# Patient Record
Sex: FEMALE | Race: WHITE | NOT HISPANIC OR LATINO
[De-identification: names, ages, dates, MRNs, and addresses within clinical notes are randomized per-mention and may not be internally consistent; named-entity substitution may affect disease eponyms.]

---

## 2017-06-30 ENCOUNTER — NON-APPOINTMENT (OUTPATIENT)
Age: 22
End: 2017-06-30

## 2017-06-30 ENCOUNTER — APPOINTMENT (OUTPATIENT)
Dept: INTERNAL MEDICINE | Facility: CLINIC | Age: 22
End: 2017-06-30

## 2017-06-30 VITALS — RESPIRATION RATE: 14 BRPM | HEART RATE: 72 BPM | SYSTOLIC BLOOD PRESSURE: 110 MMHG | DIASTOLIC BLOOD PRESSURE: 70 MMHG

## 2017-06-30 VITALS — WEIGHT: 127 LBS | BODY MASS INDEX: 21.42 KG/M2 | HEIGHT: 64.5 IN

## 2017-06-30 DIAGNOSIS — Z83.79 FAMILY HISTORY OF OTHER DISEASES OF THE DIGESTIVE SYSTEM: ICD-10-CM

## 2017-06-30 DIAGNOSIS — Z87.09 PERSONAL HISTORY OF OTHER DISEASES OF THE RESPIRATORY SYSTEM: ICD-10-CM

## 2017-06-30 DIAGNOSIS — Z82.49 FAMILY HISTORY OF ISCHEMIC HEART DISEASE AND OTHER DISEASES OF THE CIRCULATORY SYSTEM: ICD-10-CM

## 2017-06-30 DIAGNOSIS — Z78.9 OTHER SPECIFIED HEALTH STATUS: ICD-10-CM

## 2017-06-30 DIAGNOSIS — Z82.0 FAMILY HISTORY OF EPILEPSY AND OTHER DISEASES OF THE NERVOUS SYSTEM: ICD-10-CM

## 2017-06-30 DIAGNOSIS — Z83.49 FAMILY HISTORY OF OTHER ENDOCRINE, NUTRITIONAL AND METABOLIC DISEASES: ICD-10-CM

## 2017-06-30 DIAGNOSIS — Z80.8 FAMILY HISTORY OF MALIGNANT NEOPLASM OF OTHER ORGANS OR SYSTEMS: ICD-10-CM

## 2017-06-30 DIAGNOSIS — Z87.39 PERSONAL HISTORY OF OTHER DISEASES OF THE MUSCULOSKELETAL SYSTEM AND CONNECTIVE TISSUE: ICD-10-CM

## 2017-06-30 RX ORDER — NORETHINDRONE ACETATE, ETHINYL ESTRADIOL AND FERROUS FUMARATE 1MG-20(24)
1-20 KIT ORAL
Qty: 84 | Refills: 0 | Status: ACTIVE | COMMUNITY
Start: 2017-05-15

## 2017-07-01 LAB
25(OH)D3 SERPL-MCNC: 46.2 NG/ML
ALBUMIN SERPL ELPH-MCNC: 4.7 G/DL
ALP BLD-CCNC: 42 U/L
ALT SERPL-CCNC: 6 U/L
ANION GAP SERPL CALC-SCNC: 17 MMOL/L
AST SERPL-CCNC: 19 U/L
BASOPHILS # BLD AUTO: 0.02 K/UL
BASOPHILS NFR BLD AUTO: 0.3 %
BILIRUB SERPL-MCNC: 0.8 MG/DL
BUN SERPL-MCNC: 12 MG/DL
CALCIUM SERPL-MCNC: 10 MG/DL
CHLORIDE SERPL-SCNC: 102 MMOL/L
CHOLEST SERPL-MCNC: 164 MG/DL
CHOLEST/HDLC SERPL: 2.4 RATIO
CO2 SERPL-SCNC: 21 MMOL/L
CREAT SERPL-MCNC: 1.02 MG/DL
EOSINOPHIL # BLD AUTO: 0.22 K/UL
EOSINOPHIL NFR BLD AUTO: 3.2 %
FOLATE SERPL-MCNC: 13.4 NG/ML
GLUCOSE SERPL-MCNC: 100 MG/DL
HBA1C MFR BLD HPLC: 5.1 %
HCT VFR BLD CALC: 43 %
HDLC SERPL-MCNC: 68 MG/DL
HGB BLD-MCNC: 13.9 G/DL
IMM GRANULOCYTES NFR BLD AUTO: 0.1 %
LDLC SERPL CALC-MCNC: 85 MG/DL
LYMPHOCYTES # BLD AUTO: 1.89 K/UL
LYMPHOCYTES NFR BLD AUTO: 27.2 %
MAGNESIUM SERPL-MCNC: 2.1 MG/DL
MAN DIFF?: NORMAL
MCHC RBC-ENTMCNC: 29.3 PG
MCHC RBC-ENTMCNC: 32.3 GM/DL
MCV RBC AUTO: 90.5 FL
MEV IGG FLD QL IA: >300 AU/ML
MEV IGG+IGM SER-IMP: POSITIVE
MONOCYTES # BLD AUTO: 0.53 K/UL
MONOCYTES NFR BLD AUTO: 7.6 %
MUV AB SER-ACNC: POSITIVE
MUV IGG SER QL IA: 80 AU/ML
NEUTROPHILS # BLD AUTO: 4.27 K/UL
NEUTROPHILS NFR BLD AUTO: 61.6 %
PLATELET # BLD AUTO: 163 K/UL
POTASSIUM SERPL-SCNC: 4.3 MMOL/L
PROT SERPL-MCNC: 7.7 G/DL
RBC # BLD: 4.75 M/UL
RBC # FLD: 13.4 %
RUBV IGG FLD-ACNC: 3.2 INDEX
RUBV IGG SER-IMP: POSITIVE
SODIUM SERPL-SCNC: 140 MMOL/L
T4 FREE SERPL-MCNC: 1.4 NG/DL
T4 SERPL-MCNC: 10.3 UG/DL
TRIGL SERPL-MCNC: 53 MG/DL
TSH SERPL-ACNC: 2.18 UIU/ML
VIT B12 SERPL-MCNC: 255 PG/ML
WBC # FLD AUTO: 6.94 K/UL

## 2018-06-13 ENCOUNTER — APPOINTMENT (OUTPATIENT)
Dept: INTERNAL MEDICINE | Facility: CLINIC | Age: 23
End: 2018-06-13
Payer: COMMERCIAL

## 2018-06-13 PROCEDURE — 86580 TB INTRADERMAL TEST: CPT

## 2018-07-03 ENCOUNTER — APPOINTMENT (OUTPATIENT)
Dept: INTERNAL MEDICINE | Facility: CLINIC | Age: 23
End: 2018-07-03
Payer: COMMERCIAL

## 2018-07-03 ENCOUNTER — MED ADMIN CHARGE (OUTPATIENT)
Age: 23
End: 2018-07-03

## 2018-07-03 PROCEDURE — 90715 TDAP VACCINE 7 YRS/> IM: CPT

## 2018-07-03 PROCEDURE — 90471 IMMUNIZATION ADMIN: CPT

## 2018-07-23 PROBLEM — Z80.8 FAMILY HISTORY OF BASAL CELL CARCINOMA: Status: ACTIVE | Noted: 2017-06-30

## 2018-08-27 ENCOUNTER — NON-APPOINTMENT (OUTPATIENT)
Age: 23
End: 2018-08-27

## 2018-08-27 ENCOUNTER — APPOINTMENT (OUTPATIENT)
Dept: INTERNAL MEDICINE | Facility: CLINIC | Age: 23
End: 2018-08-27
Payer: COMMERCIAL

## 2018-08-27 VITALS
HEIGHT: 64.5 IN | SYSTOLIC BLOOD PRESSURE: 120 MMHG | DIASTOLIC BLOOD PRESSURE: 80 MMHG | WEIGHT: 124 LBS | BODY MASS INDEX: 20.91 KG/M2

## 2018-08-27 DIAGNOSIS — Z23 ENCOUNTER FOR IMMUNIZATION: ICD-10-CM

## 2018-08-27 DIAGNOSIS — J30.2 OTHER SEASONAL ALLERGIC RHINITIS: ICD-10-CM

## 2018-08-27 PROCEDURE — 93000 ELECTROCARDIOGRAM COMPLETE: CPT

## 2018-08-27 PROCEDURE — 99395 PREV VISIT EST AGE 18-39: CPT | Mod: 25

## 2018-08-28 ENCOUNTER — RESULT REVIEW (OUTPATIENT)
Age: 23
End: 2018-08-28

## 2018-08-28 LAB
ALBUMIN SERPL ELPH-MCNC: 4.6 G/DL
ALP BLD-CCNC: 35 U/L
ALT SERPL-CCNC: 7 U/L
ANION GAP SERPL CALC-SCNC: 12 MMOL/L
AST SERPL-CCNC: 23 U/L
BASOPHILS # BLD AUTO: 0.01 K/UL
BASOPHILS NFR BLD AUTO: 0.2 %
BILIRUB SERPL-MCNC: 1 MG/DL
BUN SERPL-MCNC: 7 MG/DL
CALCIUM SERPL-MCNC: 9.5 MG/DL
CHLORIDE SERPL-SCNC: 103 MMOL/L
CHOLEST SERPL-MCNC: 150 MG/DL
CHOLEST/HDLC SERPL: 2.6 RATIO
CO2 SERPL-SCNC: 22 MMOL/L
CREAT SERPL-MCNC: 0.66 MG/DL
EOSINOPHIL # BLD AUTO: 0.13 K/UL
EOSINOPHIL NFR BLD AUTO: 2.2 %
GLUCOSE SERPL-MCNC: 85 MG/DL
HCT VFR BLD CALC: 44.2 %
HDLC SERPL-MCNC: 58 MG/DL
HGB BLD-MCNC: 13.7 G/DL
IMM GRANULOCYTES NFR BLD AUTO: 0.2 %
LDLC SERPL CALC-MCNC: 74 MG/DL
LYMPHOCYTES # BLD AUTO: 1.74 K/UL
LYMPHOCYTES NFR BLD AUTO: 29 %
MAN DIFF?: NORMAL
MCHC RBC-ENTMCNC: 28.8 PG
MCHC RBC-ENTMCNC: 31 GM/DL
MCV RBC AUTO: 93.1 FL
MONOCYTES # BLD AUTO: 0.34 K/UL
MONOCYTES NFR BLD AUTO: 5.7 %
NEUTROPHILS # BLD AUTO: 3.77 K/UL
NEUTROPHILS NFR BLD AUTO: 62.7 %
PLATELET # BLD AUTO: 163 K/UL
POTASSIUM SERPL-SCNC: 4.2 MMOL/L
PROT SERPL-MCNC: 7.1 G/DL
RBC # BLD: 4.75 M/UL
RBC # FLD: 13.6 %
SODIUM SERPL-SCNC: 137 MMOL/L
TRIGL SERPL-MCNC: 92 MG/DL
WBC # FLD AUTO: 6 K/UL

## 2018-08-29 ENCOUNTER — RESULT REVIEW (OUTPATIENT)
Age: 23
End: 2018-08-29

## 2018-08-29 LAB — TSH SERPL-ACNC: 1.65 UIU/ML

## 2018-09-10 LAB
25(OH)D3 SERPL-MCNC: 41.1 NG/ML
FOLATE SERPL-MCNC: 17 NG/ML
HOMOCYSTEINE LEVEL: 19.2 UMOL/L
METHYLMALONIC ACID LEVEL: 124 NMOL/L
VIT B12 SERPL-MCNC: 283 PG/ML

## 2019-04-22 ENCOUNTER — APPOINTMENT (OUTPATIENT)
Dept: INTERNAL MEDICINE | Facility: CLINIC | Age: 24
End: 2019-04-22
Payer: COMMERCIAL

## 2019-04-22 VITALS
WEIGHT: 129 LBS | SYSTOLIC BLOOD PRESSURE: 120 MMHG | HEIGHT: 64.5 IN | BODY MASS INDEX: 21.75 KG/M2 | DIASTOLIC BLOOD PRESSURE: 70 MMHG

## 2019-04-22 DIAGNOSIS — L70.0 ACNE VULGARIS: ICD-10-CM

## 2019-04-22 PROCEDURE — 99213 OFFICE O/P EST LOW 20 MIN: CPT | Mod: 25

## 2019-04-22 PROCEDURE — 36415 COLL VENOUS BLD VENIPUNCTURE: CPT

## 2019-04-29 ENCOUNTER — RESULT REVIEW (OUTPATIENT)
Age: 24
End: 2019-04-29

## 2019-04-29 LAB — VIT B12 SERPL-MCNC: 700 PG/ML

## 2019-05-01 LAB
BASOPHILS # BLD AUTO: 0.03 K/UL
BASOPHILS NFR BLD AUTO: 0.6 %
EOSINOPHIL # BLD AUTO: 0.23 K/UL
EOSINOPHIL NFR BLD AUTO: 4.6 %
HCT VFR BLD CALC: 43.1 %
HGB BLD-MCNC: 13.8 G/DL
IMM GRANULOCYTES NFR BLD AUTO: 0.4 %
LYMPHOCYTES # BLD AUTO: 1.82 K/UL
LYMPHOCYTES NFR BLD AUTO: 36.8 %
MAN DIFF?: NORMAL
MCHC RBC-ENTMCNC: 29.7 PG
MCHC RBC-ENTMCNC: 32 GM/DL
MCV RBC AUTO: 92.9 FL
MONOCYTES # BLD AUTO: 0.44 K/UL
MONOCYTES NFR BLD AUTO: 8.9 %
NEUTROPHILS # BLD AUTO: 2.41 K/UL
NEUTROPHILS NFR BLD AUTO: 48.7 %
PLATELET # BLD AUTO: 135 K/UL
RBC # BLD: 4.64 M/UL
RBC # FLD: 12.9 %
WBC # FLD AUTO: 4.95 K/UL

## 2019-09-18 ENCOUNTER — NON-APPOINTMENT (OUTPATIENT)
Age: 24
End: 2019-09-18

## 2019-09-18 ENCOUNTER — APPOINTMENT (OUTPATIENT)
Dept: INTERNAL MEDICINE | Facility: CLINIC | Age: 24
End: 2019-09-18
Payer: COMMERCIAL

## 2019-09-18 VITALS
BODY MASS INDEX: 23.1 KG/M2 | DIASTOLIC BLOOD PRESSURE: 80 MMHG | HEIGHT: 63.5 IN | SYSTOLIC BLOOD PRESSURE: 120 MMHG | WEIGHT: 132 LBS

## 2019-09-18 DIAGNOSIS — D24.2 BENIGN NEOPLASM OF LEFT BREAST: ICD-10-CM

## 2019-09-18 DIAGNOSIS — D24.1 BENIGN NEOPLASM OF RIGHT BREAST: ICD-10-CM

## 2019-09-18 PROCEDURE — 36415 COLL VENOUS BLD VENIPUNCTURE: CPT

## 2019-09-18 PROCEDURE — 93000 ELECTROCARDIOGRAM COMPLETE: CPT

## 2019-09-18 PROCEDURE — 99395 PREV VISIT EST AGE 18-39: CPT | Mod: 25

## 2019-09-23 LAB
M TB IFN-G BLD-IMP: NEGATIVE
QUANTIFERON TB PLUS MITOGEN MINUS NIL: >10 IU/ML
QUANTIFERON TB PLUS NIL: 0.02 IU/ML
QUANTIFERON TB PLUS TB1 MINUS NIL: 0 IU/ML
QUANTIFERON TB PLUS TB2 MINUS NIL: 0.01 IU/ML

## 2019-11-06 ENCOUNTER — APPOINTMENT (OUTPATIENT)
Dept: INTERNAL MEDICINE | Facility: CLINIC | Age: 24
End: 2019-11-06
Payer: COMMERCIAL

## 2019-11-06 VITALS — RESPIRATION RATE: 14 BRPM | DIASTOLIC BLOOD PRESSURE: 80 MMHG | SYSTOLIC BLOOD PRESSURE: 120 MMHG | HEART RATE: 72 BPM

## 2019-11-06 VITALS — BODY MASS INDEX: 22.53 KG/M2 | WEIGHT: 132 LBS | HEIGHT: 64 IN

## 2019-11-06 PROCEDURE — 99213 OFFICE O/P EST LOW 20 MIN: CPT

## 2019-11-06 NOTE — ASSESSMENT
[FreeTextEntry1] : URI - likely viral\par \par Flonase\par Mucinex D\par \par F/U if symptoms do not resolve, or if they should change/worsen.\par

## 2019-11-06 NOTE — REVIEW OF SYSTEMS
[Fever] : no fever [Chills] : no chills [Nasal Discharge] : nasal discharge [Sore Throat] : no sore throat [Postnasal Drip] : postnasal drip [Wheezing] : no wheezing [Cough] : cough [Negative] : Genitourinary

## 2019-11-06 NOTE — HISTORY OF PRESENT ILLNESS
[Congestion] : congestion [Cough] : cough [Cold Symptoms] : cold symptoms [Moderate] : moderate [___ Days ago] : [unfilled] days ago [Constant] : constant [OTC Remedies] : OTC remedies [At Night] : at night [Worsening] : worsening [FreeTextEntry8] : Pt presents with URI symptoms for about 5 days.

## 2020-09-21 DIAGNOSIS — J06.9 ACUTE UPPER RESPIRATORY INFECTION, UNSPECIFIED: ICD-10-CM

## 2020-09-21 LAB
25(OH)D3 SERPL-MCNC: 37 NG/ML
ALBUMIN SERPL ELPH-MCNC: 4.8 G/DL
ALP BLD-CCNC: 48 U/L
ALT SERPL-CCNC: 6 U/L
ANION GAP SERPL CALC-SCNC: 13 MMOL/L
APPEARANCE: CLEAR
AST SERPL-CCNC: 16 U/L
BASOPHILS # BLD AUTO: 0.03 K/UL
BASOPHILS NFR BLD AUTO: 0.5 %
BILIRUB SERPL-MCNC: 0.5 MG/DL
BILIRUBIN URINE: NEGATIVE
BLOOD URINE: NEGATIVE
BUN SERPL-MCNC: 15 MG/DL
CALCIUM SERPL-MCNC: 9.7 MG/DL
CHLORIDE SERPL-SCNC: 104 MMOL/L
CO2 SERPL-SCNC: 22 MMOL/L
COLOR: YELLOW
CREAT SERPL-MCNC: 0.8 MG/DL
EOSINOPHIL # BLD AUTO: 0.15 K/UL
EOSINOPHIL NFR BLD AUTO: 2.3 %
GLUCOSE QUALITATIVE U: NEGATIVE
GLUCOSE SERPL-MCNC: 102 MG/DL
HCT VFR BLD CALC: 45.9 %
HGB BLD-MCNC: 14.7 G/DL
IMM GRANULOCYTES NFR BLD AUTO: 0.3 %
KETONES URINE: NEGATIVE
LEUKOCYTE ESTERASE URINE: NEGATIVE
LYMPHOCYTES # BLD AUTO: 2.17 K/UL
LYMPHOCYTES NFR BLD AUTO: 33.5 %
M TB IFN-G BLD-IMP: NEGATIVE
MAN DIFF?: NORMAL
MCHC RBC-ENTMCNC: 29.5 PG
MCHC RBC-ENTMCNC: 32 GM/DL
MCV RBC AUTO: 92.2 FL
MONOCYTES # BLD AUTO: 0.44 K/UL
MONOCYTES NFR BLD AUTO: 6.8 %
NEUTROPHILS # BLD AUTO: 3.67 K/UL
NEUTROPHILS NFR BLD AUTO: 56.6 %
NITRITE URINE: NEGATIVE
PH URINE: 7.5
PLATELET # BLD AUTO: 200 K/UL
POTASSIUM SERPL-SCNC: 4.3 MMOL/L
PROT SERPL-MCNC: 7.3 G/DL
PROTEIN URINE: NORMAL
QUANTIFERON TB PLUS MITOGEN MINUS NIL: 5.97 IU/ML
QUANTIFERON TB PLUS NIL: 0.01 IU/ML
QUANTIFERON TB PLUS TB1 MINUS NIL: 0.01 IU/ML
QUANTIFERON TB PLUS TB2 MINUS NIL: 0.04 IU/ML
RBC # BLD: 4.98 M/UL
RBC # FLD: 12.4 %
SODIUM SERPL-SCNC: 139 MMOL/L
SPECIFIC GRAVITY URINE: 1.03
T4 SERPL-MCNC: 10.8 UG/DL
TSH SERPL-ACNC: 2.36 UIU/ML
UROBILINOGEN URINE: NORMAL
WBC # FLD AUTO: 6.48 K/UL

## 2020-10-02 ENCOUNTER — APPOINTMENT (OUTPATIENT)
Dept: INTERNAL MEDICINE | Facility: CLINIC | Age: 25
End: 2020-10-02
Payer: COMMERCIAL

## 2020-10-02 ENCOUNTER — NON-APPOINTMENT (OUTPATIENT)
Age: 25
End: 2020-10-02

## 2020-10-02 VITALS
TEMPERATURE: 98.6 F | HEIGHT: 64 IN | OXYGEN SATURATION: 98 % | DIASTOLIC BLOOD PRESSURE: 80 MMHG | WEIGHT: 132 LBS | SYSTOLIC BLOOD PRESSURE: 110 MMHG | BODY MASS INDEX: 22.53 KG/M2 | HEART RATE: 103 BPM

## 2020-10-02 DIAGNOSIS — Z87.09 PERSONAL HISTORY OF OTHER DISEASES OF THE RESPIRATORY SYSTEM: ICD-10-CM

## 2020-10-02 LAB
CHOLEST SERPL-MCNC: 176 MG/DL
CHOLEST/HDLC SERPL: 3.1 RATIO
ESTIMATED AVERAGE GLUCOSE: 100 MG/DL
HBA1C MFR BLD HPLC: 5.1 %
HDLC SERPL-MCNC: 57 MG/DL
LDLC SERPL CALC-MCNC: 106 MG/DL
TRIGL SERPL-MCNC: 66 MG/DL
VIT B12 SERPL-MCNC: 566 PG/ML

## 2020-10-02 PROCEDURE — 90686 IIV4 VACC NO PRSV 0.5 ML IM: CPT

## 2020-10-02 PROCEDURE — 99395 PREV VISIT EST AGE 18-39: CPT | Mod: 25

## 2020-10-02 PROCEDURE — G0008: CPT

## 2021-09-03 ENCOUNTER — APPOINTMENT (OUTPATIENT)
Dept: INTERNAL MEDICINE | Facility: CLINIC | Age: 26
End: 2021-09-03

## 2021-10-18 ENCOUNTER — NON-APPOINTMENT (OUTPATIENT)
Age: 26
End: 2021-10-18

## 2021-10-18 ENCOUNTER — APPOINTMENT (OUTPATIENT)
Dept: INTERNAL MEDICINE | Facility: CLINIC | Age: 26
End: 2021-10-18
Payer: COMMERCIAL

## 2021-10-18 VITALS
SYSTOLIC BLOOD PRESSURE: 100 MMHG | WEIGHT: 136 LBS | HEIGHT: 64 IN | DIASTOLIC BLOOD PRESSURE: 70 MMHG | BODY MASS INDEX: 23.22 KG/M2

## 2021-10-18 PROCEDURE — 90686 IIV4 VACC NO PRSV 0.5 ML IM: CPT

## 2021-10-18 PROCEDURE — 36415 COLL VENOUS BLD VENIPUNCTURE: CPT

## 2021-10-18 PROCEDURE — 99395 PREV VISIT EST AGE 18-39: CPT | Mod: 25

## 2021-10-18 PROCEDURE — 93000 ELECTROCARDIOGRAM COMPLETE: CPT

## 2021-10-18 PROCEDURE — G0008: CPT

## 2021-10-19 ENCOUNTER — NON-APPOINTMENT (OUTPATIENT)
Age: 26
End: 2021-10-19

## 2021-10-19 LAB
25(OH)D3 SERPL-MCNC: 37.2 NG/ML
ALBUMIN SERPL ELPH-MCNC: 4.9 G/DL
ALP BLD-CCNC: 44 U/L
ALT SERPL-CCNC: 9 U/L
ANION GAP SERPL CALC-SCNC: 16 MMOL/L
APPEARANCE: CLEAR
AST SERPL-CCNC: 19 U/L
BASOPHILS # BLD AUTO: 0.04 K/UL
BASOPHILS NFR BLD AUTO: 0.6 %
BILIRUB SERPL-MCNC: 0.7 MG/DL
BILIRUBIN URINE: NEGATIVE
BLOOD URINE: NEGATIVE
BUN SERPL-MCNC: 8 MG/DL
CALCIUM SERPL-MCNC: 9.8 MG/DL
CHLORIDE SERPL-SCNC: 101 MMOL/L
CHOLEST SERPL-MCNC: 186 MG/DL
CO2 SERPL-SCNC: 21 MMOL/L
COLOR: NORMAL
CREAT SERPL-MCNC: 0.78 MG/DL
EOSINOPHIL # BLD AUTO: 0.11 K/UL
EOSINOPHIL NFR BLD AUTO: 1.8 %
ESTIMATED AVERAGE GLUCOSE: 100 MG/DL
GLUCOSE QUALITATIVE U: NEGATIVE
GLUCOSE SERPL-MCNC: 85 MG/DL
HBA1C MFR BLD HPLC: 5.1 %
HCT VFR BLD CALC: 42.6 %
HDLC SERPL-MCNC: 67 MG/DL
HGB BLD-MCNC: 14 G/DL
IMM GRANULOCYTES NFR BLD AUTO: 0.2 %
KETONES URINE: NEGATIVE
LDLC SERPL CALC-MCNC: 105 MG/DL
LEUKOCYTE ESTERASE URINE: NEGATIVE
LYMPHOCYTES # BLD AUTO: 2.24 K/UL
LYMPHOCYTES NFR BLD AUTO: 36.3 %
MAN DIFF?: NORMAL
MCHC RBC-ENTMCNC: 29.8 PG
MCHC RBC-ENTMCNC: 32.9 GM/DL
MCV RBC AUTO: 90.6 FL
MONOCYTES # BLD AUTO: 0.35 K/UL
MONOCYTES NFR BLD AUTO: 5.7 %
NEUTROPHILS # BLD AUTO: 3.42 K/UL
NEUTROPHILS NFR BLD AUTO: 55.4 %
NITRITE URINE: NEGATIVE
NONHDLC SERPL-MCNC: 119 MG/DL
PH URINE: 6.5
PLATELET # BLD AUTO: 165 K/UL
POTASSIUM SERPL-SCNC: 4.1 MMOL/L
PROT SERPL-MCNC: 7.1 G/DL
PROTEIN URINE: NEGATIVE
RBC # BLD: 4.7 M/UL
RBC # FLD: 12.4 %
SODIUM SERPL-SCNC: 139 MMOL/L
SPECIFIC GRAVITY URINE: 1.01
TRIGL SERPL-MCNC: 68 MG/DL
TSH SERPL-ACNC: 1.97 UIU/ML
UROBILINOGEN URINE: NORMAL
VIT B12 SERPL-MCNC: 500 PG/ML
WBC # FLD AUTO: 6.17 K/UL

## 2021-10-22 ENCOUNTER — NON-APPOINTMENT (OUTPATIENT)
Age: 26
End: 2021-10-22

## 2021-10-22 LAB
M TB IFN-G BLD-IMP: NEGATIVE
QUANTIFERON TB PLUS MITOGEN MINUS NIL: 9.31 IU/ML
QUANTIFERON TB PLUS NIL: 0.02 IU/ML
QUANTIFERON TB PLUS TB1 MINUS NIL: -0.01 IU/ML
QUANTIFERON TB PLUS TB2 MINUS NIL: 0.01 IU/ML

## 2022-11-11 ENCOUNTER — NON-APPOINTMENT (OUTPATIENT)
Age: 27
End: 2022-11-11

## 2022-11-11 ENCOUNTER — APPOINTMENT (OUTPATIENT)
Dept: INTERNAL MEDICINE | Facility: CLINIC | Age: 27
End: 2022-11-11

## 2022-11-11 VITALS
HEIGHT: 64 IN | DIASTOLIC BLOOD PRESSURE: 70 MMHG | WEIGHT: 133 LBS | SYSTOLIC BLOOD PRESSURE: 100 MMHG | BODY MASS INDEX: 22.71 KG/M2

## 2022-11-11 DIAGNOSIS — Z23 ENCOUNTER FOR IMMUNIZATION: ICD-10-CM

## 2022-11-11 DIAGNOSIS — U07.1 COVID-19: ICD-10-CM

## 2022-11-11 DIAGNOSIS — Z11.1 ENCOUNTER FOR SCREENING FOR RESPIRATORY TUBERCULOSIS: ICD-10-CM

## 2022-11-11 PROCEDURE — 36415 COLL VENOUS BLD VENIPUNCTURE: CPT

## 2022-11-11 PROCEDURE — G0008: CPT

## 2022-11-11 PROCEDURE — G0444 DEPRESSION SCREEN ANNUAL: CPT | Mod: 59

## 2022-11-11 PROCEDURE — 99395 PREV VISIT EST AGE 18-39: CPT | Mod: 25

## 2022-11-11 PROCEDURE — 93000 ELECTROCARDIOGRAM COMPLETE: CPT | Mod: 59

## 2022-11-11 PROCEDURE — 90686 IIV4 VACC NO PRSV 0.5 ML IM: CPT

## 2022-11-11 RX ORDER — CHLORHEXIDINE GLUCONATE 4 %
1000 LIQUID (ML) TOPICAL DAILY
Qty: 30 | Refills: 10 | Status: DISCONTINUED | COMMUNITY
Start: 2018-09-10 | End: 2022-11-11

## 2022-11-11 NOTE — ASSESSMENT
[FreeTextEntry1] : 26F c vitamin b12 deficiency here for cpe\par \par ghm - check fasting BW \par physical ecg performed - ekg nsr \par utd with gyn exam - due for f/u again in12/2020 \par states has repeat breast US breast for fibroadenoma this month\par flu shot given today\par advised covid bivalent vaccine \par \par rtc in 1 year for cpe or prn \par \par paperwork to be completed pending Quant [employer] results. \par

## 2022-11-11 NOTE — HISTORY OF PRESENT ILLNESS
[FreeTextEntry1] : \par cpe  [de-identified] : diet: good\par exercise: walking for commute - now moved to [place] \par \par reports having mild sx of covid-19 in 3/22 \par \par in interval - going for repeat breast imaging for b/l fibroadenoma because states "one grew biggeR" - GYN dr. Radford from [facility] is monitoring her fibroadenoma\par

## 2022-11-14 ENCOUNTER — NON-APPOINTMENT (OUTPATIENT)
Age: 27
End: 2022-11-14

## 2022-11-14 LAB
ALBUMIN SERPL ELPH-MCNC: 4.8 G/DL
ALP BLD-CCNC: 47 U/L
ALT SERPL-CCNC: 5 U/L
ANION GAP SERPL CALC-SCNC: 13 MMOL/L
APPEARANCE: CLEAR
AST SERPL-CCNC: 15 U/L
BASOPHILS # BLD AUTO: 0.02 K/UL
BASOPHILS NFR BLD AUTO: 0.3 %
BILIRUB SERPL-MCNC: 0.8 MG/DL
BILIRUBIN URINE: NEGATIVE
BLOOD URINE: NEGATIVE
BUN SERPL-MCNC: 6 MG/DL
CALCIUM SERPL-MCNC: 9.6 MG/DL
CHLORIDE SERPL-SCNC: 103 MMOL/L
CHOLEST SERPL-MCNC: 187 MG/DL
CO2 SERPL-SCNC: 22 MMOL/L
COLOR: YELLOW
CREAT SERPL-MCNC: 0.82 MG/DL
EGFR: 100 ML/MIN/1.73M2
EOSINOPHIL # BLD AUTO: 0.19 K/UL
EOSINOPHIL NFR BLD AUTO: 3.2 %
GLUCOSE QUALITATIVE U: NEGATIVE
GLUCOSE SERPL-MCNC: 92 MG/DL
HCT VFR BLD CALC: 42.4 %
HDLC SERPL-MCNC: 58 MG/DL
HGB BLD-MCNC: 14 G/DL
IMM GRANULOCYTES NFR BLD AUTO: 0.2 %
KETONES URINE: NEGATIVE
LDLC SERPL CALC-MCNC: 119 MG/DL
LEUKOCYTE ESTERASE URINE: NEGATIVE
LYMPHOCYTES # BLD AUTO: 1.94 K/UL
LYMPHOCYTES NFR BLD AUTO: 33 %
M TB IFN-G BLD-IMP: NEGATIVE
MAN DIFF?: NORMAL
MCHC RBC-ENTMCNC: 29.8 PG
MCHC RBC-ENTMCNC: 33 GM/DL
MCV RBC AUTO: 90.2 FL
MONOCYTES # BLD AUTO: 0.37 K/UL
MONOCYTES NFR BLD AUTO: 6.3 %
NEUTROPHILS # BLD AUTO: 3.35 K/UL
NEUTROPHILS NFR BLD AUTO: 57 %
NITRITE URINE: NEGATIVE
NONHDLC SERPL-MCNC: 128 MG/DL
PH URINE: 6
PLATELET # BLD AUTO: 167 K/UL
POTASSIUM SERPL-SCNC: 4.2 MMOL/L
PROT SERPL-MCNC: 7.3 G/DL
PROTEIN URINE: NORMAL
QUANTIFERON TB PLUS MITOGEN MINUS NIL: >10 IU/ML
QUANTIFERON TB PLUS NIL: 0.01 IU/ML
QUANTIFERON TB PLUS TB1 MINUS NIL: 0.01 IU/ML
QUANTIFERON TB PLUS TB2 MINUS NIL: 0.02 IU/ML
RBC # BLD: 4.7 M/UL
RBC # FLD: 12.7 %
SODIUM SERPL-SCNC: 139 MMOL/L
SPECIFIC GRAVITY URINE: 1.02
TRIGL SERPL-MCNC: 49 MG/DL
TSH SERPL-ACNC: 1.34 UIU/ML
UROBILINOGEN URINE: NORMAL
VIT B12 SERPL-MCNC: 394 PG/ML
WBC # FLD AUTO: 5.88 K/UL

## 2023-07-07 ENCOUNTER — APPOINTMENT (OUTPATIENT)
Dept: INTERNAL MEDICINE | Facility: CLINIC | Age: 28
End: 2023-07-07
Payer: COMMERCIAL

## 2023-07-07 VITALS
SYSTOLIC BLOOD PRESSURE: 120 MMHG | BODY MASS INDEX: 22.94 KG/M2 | DIASTOLIC BLOOD PRESSURE: 70 MMHG | HEIGHT: 64.5 IN | WEIGHT: 136 LBS

## 2023-07-07 DIAGNOSIS — E53.8 DEFICIENCY OF OTHER SPECIFIED B GROUP VITAMINS: ICD-10-CM

## 2023-07-07 PROCEDURE — 99213 OFFICE O/P EST LOW 20 MIN: CPT

## 2023-07-08 PROBLEM — E53.8 VITAMIN B12 DEFICIENCY: Status: ACTIVE | Noted: 2018-09-10

## 2023-07-08 NOTE — HISTORY OF PRESENT ILLNESS
[FreeTextEntry1] : \par f/u  [de-identified] : \par here for forms to be filled out \par no acute complaints \par \par b12 deficiency - on b12\par

## 2023-09-12 ENCOUNTER — APPOINTMENT (OUTPATIENT)
Dept: OBGYN | Facility: CLINIC | Age: 28
End: 2023-09-12
Payer: COMMERCIAL

## 2023-09-12 PROCEDURE — 99395 PREV VISIT EST AGE 18-39: CPT

## 2023-11-08 ENCOUNTER — NON-APPOINTMENT (OUTPATIENT)
Age: 28
End: 2023-11-08

## 2023-11-08 ENCOUNTER — LABORATORY RESULT (OUTPATIENT)
Age: 28
End: 2023-11-08

## 2023-11-08 ENCOUNTER — APPOINTMENT (OUTPATIENT)
Dept: INTERNAL MEDICINE | Facility: CLINIC | Age: 28
End: 2023-11-08
Payer: COMMERCIAL

## 2023-11-08 VITALS
HEIGHT: 64.5 IN | WEIGHT: 135 LBS | BODY MASS INDEX: 22.77 KG/M2 | SYSTOLIC BLOOD PRESSURE: 110 MMHG | DIASTOLIC BLOOD PRESSURE: 60 MMHG

## 2023-11-08 DIAGNOSIS — E55.9 VITAMIN D DEFICIENCY, UNSPECIFIED: ICD-10-CM

## 2023-11-08 DIAGNOSIS — R21 RASH AND OTHER NONSPECIFIC SKIN ERUPTION: ICD-10-CM

## 2023-11-08 DIAGNOSIS — Z11.1 ENCOUNTER FOR SCREENING FOR RESPIRATORY TUBERCULOSIS: ICD-10-CM

## 2023-11-08 DIAGNOSIS — Z00.00 ENCOUNTER FOR GENERAL ADULT MEDICAL EXAMINATION W/OUT ABNORMAL FINDINGS: ICD-10-CM

## 2023-11-08 PROCEDURE — 36415 COLL VENOUS BLD VENIPUNCTURE: CPT

## 2023-11-08 PROCEDURE — 93000 ELECTROCARDIOGRAM COMPLETE: CPT | Mod: 59

## 2023-11-08 PROCEDURE — 90686 IIV4 VACC NO PRSV 0.5 ML IM: CPT

## 2023-11-08 PROCEDURE — G0008: CPT

## 2023-11-08 PROCEDURE — G0444 DEPRESSION SCREEN ANNUAL: CPT | Mod: 59

## 2023-11-08 PROCEDURE — 99395 PREV VISIT EST AGE 18-39: CPT | Mod: 25

## 2023-11-09 ENCOUNTER — TRANSCRIPTION ENCOUNTER (OUTPATIENT)
Age: 28
End: 2023-11-09

## 2023-11-13 ENCOUNTER — NON-APPOINTMENT (OUTPATIENT)
Age: 28
End: 2023-11-13

## 2023-11-13 LAB
25(OH)D3 SERPL-MCNC: 36.7 NG/ML
ALBUMIN SERPL ELPH-MCNC: 5 G/DL
ALP BLD-CCNC: 47 U/L
ALT SERPL-CCNC: 7 U/L
ANION GAP SERPL CALC-SCNC: 11 MMOL/L
APPEARANCE: CLEAR
AST SERPL-CCNC: 12 U/L
BASOPHILS # BLD AUTO: 0.03 K/UL
BASOPHILS NFR BLD AUTO: 0.4 %
BILIRUB SERPL-MCNC: 0.8 MG/DL
BILIRUBIN URINE: NEGATIVE
BLOOD URINE: NEGATIVE
BUN SERPL-MCNC: 10 MG/DL
CALCIUM SERPL-MCNC: 9.7 MG/DL
CHLORIDE SERPL-SCNC: 99 MMOL/L
CHOLEST SERPL-MCNC: 191 MG/DL
CO2 SERPL-SCNC: 24 MMOL/L
COLOR: YELLOW
CREAT SERPL-MCNC: 0.8 MG/DL
EGFR: 103 ML/MIN/1.73M2
EOSINOPHIL # BLD AUTO: 0.11 K/UL
EOSINOPHIL NFR BLD AUTO: 1.6 %
ESTIMATED AVERAGE GLUCOSE: 103 MG/DL
FOLATE SERPL-MCNC: 18.6 NG/ML
GLUCOSE QUALITATIVE U: NEGATIVE MG/DL
GLUCOSE SERPL-MCNC: 81 MG/DL
HBA1C MFR BLD HPLC: 5.2 %
HBV CORE IGG+IGM SER QL: NONREACTIVE
HBV SURFACE AB SERPL IA-ACNC: <3 MIU/ML
HBV SURFACE AG SER QL: NONREACTIVE
HCT VFR BLD CALC: 40.5 %
HDLC SERPL-MCNC: 63 MG/DL
HEPATITIS A IGG ANTIBODY: REACTIVE
HGB BLD-MCNC: 13.6 G/DL
IMM GRANULOCYTES NFR BLD AUTO: 0.4 %
KETONES URINE: NEGATIVE MG/DL
LDLC SERPL CALC-MCNC: 117 MG/DL
LEUKOCYTE ESTERASE URINE: ABNORMAL
LYMPHOCYTES # BLD AUTO: 2.15 K/UL
LYMPHOCYTES NFR BLD AUTO: 30.5 %
M TB IFN-G BLD-IMP: NEGATIVE
MAN DIFF?: NORMAL
MCHC RBC-ENTMCNC: 29.9 PG
MCHC RBC-ENTMCNC: 33.6 GM/DL
MCV RBC AUTO: 89 FL
MONOCYTES # BLD AUTO: 0.44 K/UL
MONOCYTES NFR BLD AUTO: 6.2 %
NEUTROPHILS # BLD AUTO: 4.29 K/UL
NEUTROPHILS NFR BLD AUTO: 60.9 %
NITRITE URINE: NEGATIVE
NONHDLC SERPL-MCNC: 128 MG/DL
PH URINE: 6.5
PLATELET # BLD AUTO: 161 K/UL
POTASSIUM SERPL-SCNC: 4.3 MMOL/L
PROT SERPL-MCNC: 7.7 G/DL
PROTEIN URINE: NEGATIVE MG/DL
QUANTIFERON TB PLUS MITOGEN MINUS NIL: 1.92 IU/ML
QUANTIFERON TB PLUS NIL: 0.01 IU/ML
QUANTIFERON TB PLUS TB1 MINUS NIL: 0.01 IU/ML
QUANTIFERON TB PLUS TB2 MINUS NIL: 0.01 IU/ML
RBC # BLD: 4.55 M/UL
RBC # FLD: 13.3 %
SODIUM SERPL-SCNC: 135 MMOL/L
SPECIFIC GRAVITY URINE: 1.01
T4 FREE SERPL-MCNC: 1.4 NG/DL
T4 SERPL-MCNC: 11 UG/DL
TRIGL SERPL-MCNC: 58 MG/DL
TSH SERPL-ACNC: 2.38 UIU/ML
UROBILINOGEN URINE: 0.2 MG/DL
VIT B12 SERPL-MCNC: 407 PG/ML
VZV AB TITR SER: NEGATIVE
VZV IGG SER IF-ACNC: 56.7 INDEX
WBC # FLD AUTO: 7.05 K/UL

## 2023-12-06 ENCOUNTER — TRANSCRIPTION ENCOUNTER (OUTPATIENT)
Age: 28
End: 2023-12-06

## 2024-08-08 ENCOUNTER — TRANSCRIPTION ENCOUNTER (OUTPATIENT)
Age: 29
End: 2024-08-08

## 2024-11-06 ENCOUNTER — APPOINTMENT (OUTPATIENT)
Dept: INTERNAL MEDICINE | Facility: CLINIC | Age: 29
End: 2024-11-06
Payer: COMMERCIAL

## 2024-11-06 PROCEDURE — 36415 COLL VENOUS BLD VENIPUNCTURE: CPT

## 2024-11-11 ENCOUNTER — APPOINTMENT (OUTPATIENT)
Dept: INTERNAL MEDICINE | Facility: CLINIC | Age: 29
End: 2024-11-11
Payer: COMMERCIAL

## 2024-11-11 ENCOUNTER — NON-APPOINTMENT (OUTPATIENT)
Age: 29
End: 2024-11-11

## 2024-11-11 VITALS
BODY MASS INDEX: 24.41 KG/M2 | SYSTOLIC BLOOD PRESSURE: 120 MMHG | DIASTOLIC BLOOD PRESSURE: 80 MMHG | WEIGHT: 143 LBS | HEIGHT: 64.17 IN

## 2024-11-11 DIAGNOSIS — Z23 ENCOUNTER FOR IMMUNIZATION: ICD-10-CM

## 2024-11-11 DIAGNOSIS — Z00.00 ENCOUNTER FOR GENERAL ADULT MEDICAL EXAMINATION W/OUT ABNORMAL FINDINGS: ICD-10-CM

## 2024-11-11 LAB
ALBUMIN SERPL ELPH-MCNC: 4.7 G/DL
ALP BLD-CCNC: 54 U/L
ALT SERPL-CCNC: 7 U/L
ANION GAP SERPL CALC-SCNC: 16 MMOL/L
APPEARANCE: CLEAR
AST SERPL-CCNC: 17 U/L
BASOPHILS # BLD AUTO: 0.05 K/UL
BASOPHILS NFR BLD AUTO: 0.7 %
BILIRUB SERPL-MCNC: 0.6 MG/DL
BILIRUBIN URINE: NEGATIVE
BLOOD URINE: NEGATIVE
BUN SERPL-MCNC: 9 MG/DL
CALCIUM SERPL-MCNC: 9.3 MG/DL
CHLORIDE SERPL-SCNC: 100 MMOL/L
CHOLEST SERPL-MCNC: 175 MG/DL
CO2 SERPL-SCNC: 22 MMOL/L
COLOR: YELLOW
CREAT SERPL-MCNC: 0.78 MG/DL
EGFR: 105 ML/MIN/1.73M2
EOSINOPHIL # BLD AUTO: 0.22 K/UL
EOSINOPHIL NFR BLD AUTO: 3.2 %
ESTIMATED AVERAGE GLUCOSE: 105 MG/DL
GLUCOSE QUALITATIVE U: NEGATIVE MG/DL
GLUCOSE SERPL-MCNC: 83 MG/DL
HBA1C MFR BLD HPLC: 5.3 %
HCT VFR BLD CALC: 42.4 %
HDLC SERPL-MCNC: 62 MG/DL
HGB BLD-MCNC: 13.8 G/DL
IMM GRANULOCYTES NFR BLD AUTO: 0.3 %
KETONES URINE: ABNORMAL MG/DL
LDLC SERPL CALC-MCNC: 103 MG/DL
LEUKOCYTE ESTERASE URINE: NEGATIVE
LYMPHOCYTES # BLD AUTO: 2.1 K/UL
LYMPHOCYTES NFR BLD AUTO: 30.5 %
M TB IFN-G BLD-IMP: NEGATIVE
MAN DIFF?: NORMAL
MCHC RBC-ENTMCNC: 29.9 PG
MCHC RBC-ENTMCNC: 32.5 G/DL
MCV RBC AUTO: 91.8 FL
MONOCYTES # BLD AUTO: 0.45 K/UL
MONOCYTES NFR BLD AUTO: 6.5 %
NEUTROPHILS # BLD AUTO: 4.04 K/UL
NEUTROPHILS NFR BLD AUTO: 58.8 %
NITRITE URINE: NEGATIVE
NONHDLC SERPL-MCNC: 113 MG/DL
PH URINE: 6.5
PLATELET # BLD AUTO: 189 K/UL
POTASSIUM SERPL-SCNC: 4.1 MMOL/L
PROT SERPL-MCNC: 7.2 G/DL
PROTEIN URINE: NEGATIVE MG/DL
QUANTIFERON TB PLUS MITOGEN MINUS NIL: >10 IU/ML
QUANTIFERON TB PLUS NIL: 0.03 IU/ML
QUANTIFERON TB PLUS TB1 MINUS NIL: 0 IU/ML
QUANTIFERON TB PLUS TB2 MINUS NIL: 0 IU/ML
RBC # BLD: 4.62 M/UL
RBC # FLD: 13 %
SODIUM SERPL-SCNC: 137 MMOL/L
SPECIFIC GRAVITY URINE: 1.01
TRIGL SERPL-MCNC: 51 MG/DL
TSH SERPL-ACNC: 2.1 UIU/ML
UROBILINOGEN URINE: 0.2 MG/DL
WBC # FLD AUTO: 6.88 K/UL

## 2024-11-11 PROCEDURE — 90472 IMMUNIZATION ADMIN EACH ADD: CPT

## 2024-11-11 PROCEDURE — G0008: CPT

## 2024-11-11 PROCEDURE — 90656 IIV3 VACC NO PRSV 0.5 ML IM: CPT

## 2024-11-11 PROCEDURE — 93000 ELECTROCARDIOGRAM COMPLETE: CPT

## 2024-11-11 PROCEDURE — 90746 HEPB VACCINE 3 DOSE ADULT IM: CPT

## 2024-11-11 PROCEDURE — 99395 PREV VISIT EST AGE 18-39: CPT | Mod: 25

## 2024-12-04 ENCOUNTER — APPOINTMENT (OUTPATIENT)
Dept: OBGYN | Facility: CLINIC | Age: 29
End: 2024-12-04
Payer: COMMERCIAL

## 2024-12-04 PROCEDURE — 96127 BRIEF EMOTIONAL/BEHAV ASSMT: CPT

## 2024-12-04 PROCEDURE — 99459 PELVIC EXAMINATION: CPT

## 2024-12-04 PROCEDURE — 99385 PREV VISIT NEW AGE 18-39: CPT

## 2024-12-12 ENCOUNTER — APPOINTMENT (OUTPATIENT)
Dept: INTERNAL MEDICINE | Facility: CLINIC | Age: 29
End: 2024-12-12
Payer: COMMERCIAL

## 2024-12-12 ENCOUNTER — NON-APPOINTMENT (OUTPATIENT)
Age: 29
End: 2024-12-12

## 2024-12-12 PROCEDURE — G0010: CPT

## 2024-12-12 PROCEDURE — 90740 HEPB VACC 3 DOSE IMMUNSUP IM: CPT

## 2025-01-03 PROBLEM — R92.8 ABNORMAL FINDING ON BREAST IMAGING: Status: ACTIVE | Noted: 2025-01-03

## 2025-01-03 PROBLEM — N60.92 ATYPICAL LOBULAR HYPERPLASIA (ALH) OF LEFT BREAST: Status: ACTIVE | Noted: 2025-01-03

## 2025-01-03 PROBLEM — N60.91 ATYPICAL LOBULAR HYPERPLASIA (ALH) OF RIGHT BREAST: Status: ACTIVE | Noted: 2025-01-03

## 2025-01-08 ENCOUNTER — APPOINTMENT (OUTPATIENT)
Dept: PLASTIC SURGERY | Facility: CLINIC | Age: 30
End: 2025-01-08
Payer: COMMERCIAL

## 2025-01-08 VITALS
WEIGHT: 145 LBS | HEART RATE: 106 BPM | HEIGHT: 64 IN | OXYGEN SATURATION: 98 % | BODY MASS INDEX: 24.75 KG/M2 | TEMPERATURE: 98.7 F | RESPIRATION RATE: 16 BRPM | DIASTOLIC BLOOD PRESSURE: 91 MMHG | SYSTOLIC BLOOD PRESSURE: 144 MMHG

## 2025-01-08 DIAGNOSIS — R92.8 OTHER ABNORMAL AND INCONCLUSIVE FINDINGS ON DIAGNOSTIC IMAGING OF BREAST: ICD-10-CM

## 2025-01-08 PROCEDURE — 99205 OFFICE O/P NEW HI 60 MIN: CPT

## 2025-01-13 ENCOUNTER — OUTPATIENT (OUTPATIENT)
Dept: OUTPATIENT SERVICES | Facility: HOSPITAL | Age: 30
LOS: 1 days | End: 2025-01-13
Payer: COMMERCIAL

## 2025-01-13 ENCOUNTER — APPOINTMENT (OUTPATIENT)
Dept: MRI IMAGING | Facility: IMAGING CENTER | Age: 30
End: 2025-01-13
Payer: COMMERCIAL

## 2025-01-13 DIAGNOSIS — N60.91 UNSPECIFIED BENIGN MAMMARY DYSPLASIA OF RIGHT BREAST: ICD-10-CM

## 2025-01-13 DIAGNOSIS — N60.92 UNSPECIFIED BENIGN MAMMARY DYSPLASIA OF LEFT BREAST: ICD-10-CM

## 2025-01-13 PROCEDURE — 77049 MRI BREAST C-+ W/CAD BI: CPT | Mod: 26

## 2025-01-13 PROCEDURE — C8937: CPT

## 2025-01-13 PROCEDURE — A9585: CPT

## 2025-01-13 PROCEDURE — C8908: CPT

## 2025-01-14 ENCOUNTER — NON-APPOINTMENT (OUTPATIENT)
Age: 30
End: 2025-01-14

## 2025-01-16 ENCOUNTER — NON-APPOINTMENT (OUTPATIENT)
Age: 30
End: 2025-01-16

## 2025-01-16 ENCOUNTER — APPOINTMENT (OUTPATIENT)
Dept: PLASTIC SURGERY | Facility: CLINIC | Age: 30
End: 2025-01-16

## 2025-01-16 DIAGNOSIS — D24.2 BENIGN NEOPLASM OF LEFT BREAST: ICD-10-CM

## 2025-01-16 DIAGNOSIS — N60.92 UNSPECIFIED BENIGN MAMMARY DYSPLASIA OF LEFT BREAST: ICD-10-CM

## 2025-01-16 DIAGNOSIS — N60.91 UNSPECIFIED BENIGN MAMMARY DYSPLASIA OF RIGHT BREAST: ICD-10-CM

## 2025-01-16 DIAGNOSIS — D24.1 BENIGN NEOPLASM OF RIGHT BREAST: ICD-10-CM

## 2025-01-16 PROCEDURE — 99203 OFFICE O/P NEW LOW 30 MIN: CPT

## 2025-02-21 ENCOUNTER — OUTPATIENT (OUTPATIENT)
Dept: OUTPATIENT SERVICES | Facility: HOSPITAL | Age: 30
LOS: 1 days | End: 2025-02-21
Payer: COMMERCIAL

## 2025-02-21 VITALS
HEART RATE: 80 BPM | TEMPERATURE: 99 F | RESPIRATION RATE: 15 BRPM | OXYGEN SATURATION: 97 % | DIASTOLIC BLOOD PRESSURE: 83 MMHG | HEIGHT: 65 IN | SYSTOLIC BLOOD PRESSURE: 130 MMHG | WEIGHT: 145.51 LBS

## 2025-02-21 DIAGNOSIS — Z01.818 ENCOUNTER FOR OTHER PREPROCEDURAL EXAMINATION: ICD-10-CM

## 2025-02-21 DIAGNOSIS — N60.99 UNSPECIFIED BENIGN MAMMARY DYSPLASIA OF UNSPECIFIED BREAST: ICD-10-CM

## 2025-02-21 DIAGNOSIS — D24.2 BENIGN NEOPLASM OF LEFT BREAST: ICD-10-CM

## 2025-02-21 DIAGNOSIS — N60.92 UNSPECIFIED BENIGN MAMMARY DYSPLASIA OF LEFT BREAST: ICD-10-CM

## 2025-02-21 DIAGNOSIS — D24.1 BENIGN NEOPLASM OF RIGHT BREAST: ICD-10-CM

## 2025-02-21 DIAGNOSIS — K08.409 PARTIAL LOSS OF TEETH, UNSPECIFIED CAUSE, UNSPECIFIED CLASS: Chronic | ICD-10-CM

## 2025-02-21 DIAGNOSIS — N60.91 UNSPECIFIED BENIGN MAMMARY DYSPLASIA OF RIGHT BREAST: ICD-10-CM

## 2025-02-21 LAB
HCT VFR BLD CALC: 41.6 % — SIGNIFICANT CHANGE UP (ref 34.5–45)
HGB BLD-MCNC: 14.2 G/DL — SIGNIFICANT CHANGE UP (ref 11.5–15.5)
MCHC RBC-ENTMCNC: 29.9 PG — SIGNIFICANT CHANGE UP (ref 27–34)
MCHC RBC-ENTMCNC: 34.1 G/DL — SIGNIFICANT CHANGE UP (ref 32–36)
MCV RBC AUTO: 87.6 FL — SIGNIFICANT CHANGE UP (ref 80–100)
NRBC BLD AUTO-RTO: 0 /100 WBCS — SIGNIFICANT CHANGE UP (ref 0–0)
PLATELET # BLD AUTO: 165 K/UL — SIGNIFICANT CHANGE UP (ref 150–400)
RBC # BLD: 4.75 M/UL — SIGNIFICANT CHANGE UP (ref 3.8–5.2)
RBC # FLD: 12.5 % — SIGNIFICANT CHANGE UP (ref 10.3–14.5)
WBC # BLD: 6.5 K/UL — SIGNIFICANT CHANGE UP (ref 3.8–10.5)
WBC # FLD AUTO: 6.5 K/UL — SIGNIFICANT CHANGE UP (ref 3.8–10.5)

## 2025-02-21 PROCEDURE — 85027 COMPLETE CBC AUTOMATED: CPT

## 2025-02-21 PROCEDURE — 36415 COLL VENOUS BLD VENIPUNCTURE: CPT

## 2025-02-21 PROCEDURE — G0463: CPT

## 2025-02-21 NOTE — H&P PST ADULT - NSICDXFAMILYHX_GEN_ALL_CORE_FT
FAMILY HISTORY:  Father  Still living? Yes, Estimated age: Age Unknown  Family history of Parkinson disease, Age at diagnosis: Age Unknown    Aunt  Still living? Yes, Estimated age: Age Unknown  Family history of breast cancer, Age at diagnosis: Age Unknown

## 2025-02-21 NOTE — H&P PST ADULT - NSICDXPASTSURGICALHX_GEN_ALL_CORE_FT
Product 2 Application Directions: Apply to the affected areas twice a day for a few weeks. PAST SURGICAL HISTORY:  [place] teeth extracted

## 2025-02-21 NOTE — H&P PST ADULT - ASSESSMENT
28 yo female is scheduled for excision of bilateral breast masses X3, bilateral breast reconstruction with adjacent tissue transfer on 3/13/2025

## 2025-02-21 NOTE — H&P PST ADULT - HISTORY OF PRESENT ILLNESS
30 yo female is scheduled for excision of bilateral breast masses X3, bilateral reconstruction with adjacent tissue transfer on 3/13/2025

## 2025-02-21 NOTE — H&P PST ADULT - ATTENDING COMMENTS
The patient is a 29 year old female with bilateral palpable breast masses, s/p US core biopsies fibroadenomas, with the right 8:00 and left 9:00 breast masses also revealing atypical lobular hyperplasia. She presents to undergo excision of bilateral breast masses, with plastic surgery closure by Dr. Panda.

## 2025-03-03 ENCOUNTER — NON-APPOINTMENT (OUTPATIENT)
Age: 30
End: 2025-03-03

## 2025-03-06 RX ORDER — ONDANSETRON 4 MG/1
4 TABLET, ORALLY DISINTEGRATING ORAL
Qty: 9 | Refills: 0 | Status: ACTIVE | COMMUNITY
Start: 2025-03-06 | End: 1900-01-01

## 2025-03-06 RX ORDER — CLINDAMYCIN HYDROCHLORIDE 300 MG/1
300 CAPSULE ORAL EVERY 8 HOURS
Qty: 21 | Refills: 0 | Status: ACTIVE | COMMUNITY
Start: 2025-03-06 | End: 1900-01-01

## 2025-03-06 RX ORDER — OXYCODONE 5 MG/1
5 TABLET ORAL
Qty: 7 | Refills: 0 | Status: ACTIVE | COMMUNITY
Start: 2025-03-06 | End: 1900-01-01

## 2025-03-12 ENCOUNTER — NON-APPOINTMENT (OUTPATIENT)
Age: 30
End: 2025-03-12

## 2025-03-13 ENCOUNTER — OUTPATIENT (OUTPATIENT)
Dept: OUTPATIENT SERVICES | Facility: HOSPITAL | Age: 30
LOS: 1 days | End: 2025-03-13
Payer: COMMERCIAL

## 2025-03-13 ENCOUNTER — APPOINTMENT (OUTPATIENT)
Dept: PLASTIC SURGERY | Facility: HOSPITAL | Age: 30
End: 2025-03-13
Payer: COMMERCIAL

## 2025-03-13 ENCOUNTER — RESULT REVIEW (OUTPATIENT)
Age: 30
End: 2025-03-13

## 2025-03-13 ENCOUNTER — NON-APPOINTMENT (OUTPATIENT)
Age: 30
End: 2025-03-13

## 2025-03-13 ENCOUNTER — APPOINTMENT (OUTPATIENT)
Dept: PLASTIC SURGERY | Facility: HOSPITAL | Age: 30
End: 2025-03-13

## 2025-03-13 ENCOUNTER — TRANSCRIPTION ENCOUNTER (OUTPATIENT)
Age: 30
End: 2025-03-13

## 2025-03-13 VITALS
HEART RATE: 99 BPM | RESPIRATION RATE: 16 BRPM | TEMPERATURE: 97 F | SYSTOLIC BLOOD PRESSURE: 111 MMHG | DIASTOLIC BLOOD PRESSURE: 69 MMHG | OXYGEN SATURATION: 97 %

## 2025-03-13 VITALS
SYSTOLIC BLOOD PRESSURE: 136 MMHG | DIASTOLIC BLOOD PRESSURE: 89 MMHG | TEMPERATURE: 99 F | WEIGHT: 145.51 LBS | HEIGHT: 65 IN | HEART RATE: 104 BPM | OXYGEN SATURATION: 97 % | RESPIRATION RATE: 15 BRPM

## 2025-03-13 DIAGNOSIS — N60.91 UNSPECIFIED BENIGN MAMMARY DYSPLASIA OF RIGHT BREAST: ICD-10-CM

## 2025-03-13 DIAGNOSIS — N60.92 UNSPECIFIED BENIGN MAMMARY DYSPLASIA OF LEFT BREAST: ICD-10-CM

## 2025-03-13 DIAGNOSIS — D24.1 BENIGN NEOPLASM OF RIGHT BREAST: ICD-10-CM

## 2025-03-13 DIAGNOSIS — D24.2 BENIGN NEOPLASM OF LEFT BREAST: ICD-10-CM

## 2025-03-13 DIAGNOSIS — K08.409 PARTIAL LOSS OF TEETH, UNSPECIFIED CAUSE, UNSPECIFIED CLASS: Chronic | ICD-10-CM

## 2025-03-13 PROCEDURE — 88305 TISSUE EXAM BY PATHOLOGIST: CPT

## 2025-03-13 PROCEDURE — 12032 INTMD RPR S/A/T/EXT 2.6-7.5: CPT

## 2025-03-13 PROCEDURE — 19301 PARTIAL MASTECTOMY: CPT | Mod: 50

## 2025-03-13 PROCEDURE — 12032 INTMD RPR S/A/T/EXT 2.6-7.5: CPT | Mod: XP

## 2025-03-13 PROCEDURE — 19120 REMOVAL OF BREAST LESION: CPT | Mod: 50

## 2025-03-13 PROCEDURE — 88305 TISSUE EXAM BY PATHOLOGIST: CPT | Mod: 26

## 2025-03-13 RX ORDER — SODIUM CHLORIDE 9 G/1000ML
1000 INJECTION, SOLUTION INTRAVENOUS
Refills: 0 | Status: DISCONTINUED | OUTPATIENT
Start: 2025-03-13 | End: 2025-03-13

## 2025-03-13 RX ORDER — HYDROMORPHONE/SOD CHLOR,ISO/PF 2 MG/10 ML
0.5 SYRINGE (ML) INJECTION ONCE
Refills: 0 | Status: DISCONTINUED | OUTPATIENT
Start: 2025-03-13 | End: 2025-03-13

## 2025-03-13 RX ORDER — OXYCODONE HYDROCHLORIDE 30 MG/1
5 TABLET ORAL ONCE
Refills: 0 | Status: DISCONTINUED | OUTPATIENT
Start: 2025-03-13 | End: 2025-03-13

## 2025-03-13 RX ORDER — ACETAMINOPHEN 500 MG/5ML
650 LIQUID (ML) ORAL ONCE
Refills: 0 | Status: ACTIVE | OUTPATIENT
Start: 2025-03-13 | End: 2026-02-09

## 2025-03-13 RX ORDER — ONDANSETRON HCL/PF 4 MG/2 ML
4 VIAL (ML) INJECTION ONCE
Refills: 0 | Status: COMPLETED | OUTPATIENT
Start: 2025-03-13 | End: 2025-03-13

## 2025-03-13 RX ORDER — ACETAMINOPHEN 500 MG/5ML
650 LIQUID (ML) ORAL ONCE
Refills: 0 | Status: DISCONTINUED | OUTPATIENT
Start: 2025-03-13 | End: 2025-03-13

## 2025-03-13 RX ORDER — OXYCODONE HYDROCHLORIDE 30 MG/1
10 TABLET ORAL ONCE
Refills: 0 | Status: DISCONTINUED | OUTPATIENT
Start: 2025-03-13 | End: 2025-03-13

## 2025-03-13 RX ADMIN — SODIUM CHLORIDE 50 MILLILITER(S): 9 INJECTION, SOLUTION INTRAVENOUS at 08:47

## 2025-03-13 RX ADMIN — Medication 4 MILLIGRAM(S): at 12:15

## 2025-03-13 NOTE — ASU DISCHARGE PLAN (ADULT/PEDIATRIC) - FINANCIAL ASSISTANCE
[facility] provides services at a reduced cost to those who are determined to be eligible through [facility]’s financial assistance program. Information regarding [facility]’s financial assistance program can be found by going to [email or web address] or by calling [phone].

## 2025-03-13 NOTE — BRIEF OPERATIVE NOTE - SPECIMENS
bilateral breast masses: right 6:00, right 8:30, left 9:30 bilateral breast masses: right 6:00, right 8-8:30, left 9-9:30, left 8:00

## 2025-03-13 NOTE — BRIEF OPERATIVE NOTE - NSICDXBRIEFPROCEDURE_GEN_ALL_CORE_FT
PROCEDURES:  Excision of bilateral breast masses 13-Mar-2025 09:27:15  Palleschi, Susan M  
PROCEDURES:  Adjacent tissue transfer for unusual defect 13-Mar-2025 09:23:46  hSeeba [place]

## 2025-03-13 NOTE — BRIEF OPERATIVE NOTE - NSICDXBRIEFPOSTOP_GEN_ALL_CORE_FT
POST-OP DIAGNOSIS:  Bilateral fibroadenomas of breasts 13-Mar-2025 09:28:03  Palleschi, Susan M  Atypical lobular hyperplasia of breast, bilateral 13-Mar-2025 09:28:21  Palleschi, Susan M  
POST-OP DIAGNOSIS:  Masses of both breasts 13-Mar-2025 09:24:22  Sheeba [place]

## 2025-03-13 NOTE — BRIEF OPERATIVE NOTE - NSICDXBRIEFPREOP_GEN_ALL_CORE_FT
PRE-OP DIAGNOSIS:  Masses of both breasts 13-Mar-2025 09:24:12  Sheeba Birmingham  
PRE-OP DIAGNOSIS:  Bilateral fibroadenomas of breasts 13-Mar-2025 09:27:29  Palleschi, Susan M

## 2025-03-13 NOTE — ASU DISCHARGE PLAN (ADULT/PEDIATRIC) - CARE PROVIDER_API CALL
Shikowitz-Behr, [facility]  Plastic Surgery  [address], [address]  [place] [address]  Phone: [phone]  Fax: [phone]  Follow Up Time:     Palleschi, Susan Marie  Surgery  [address] [place] [address], [address]  [place] [address]  Phone: [phone]  Fax: [phone]  Follow Up Time:

## 2025-03-13 NOTE — ASU DISCHARGE PLAN (ADULT/PEDIATRIC) - ASU DC SPECIAL INSTRUCTIONSFT
Keep incisions clean and dry for 48hrs. May shower after 48hrs  Keep back to shower water. Replace bra after showering  Keep surgical bra in place at all other times  No heavy lifting or straining  Ambulate often  Followup in the office in one week and Dr. Palleschi in 2 weeks Keep incisions clean and dry for 48hrs. May shower after 48hrs  Keep back to shower water. Replace bra after showering  Keep surgical bra in place at all other times  No heavy lifting or straining  Ambulate often  Followup in Dr. Shikowitz-Behr office in one week and Dr. Palleschi in 2 weeks

## 2025-03-18 ENCOUNTER — APPOINTMENT (OUTPATIENT)
Dept: PLASTIC SURGERY | Facility: CLINIC | Age: 30
End: 2025-03-18

## 2025-03-18 PROBLEM — N60.99 UNSPECIFIED BENIGN MAMMARY DYSPLASIA OF UNSPECIFIED BREAST: Chronic | Status: ACTIVE | Noted: 2025-02-21

## 2025-03-18 PROCEDURE — 99024 POSTOP FOLLOW-UP VISIT: CPT

## 2025-03-20 LAB — SURGICAL PATHOLOGY STUDY: SIGNIFICANT CHANGE UP

## 2025-03-21 ENCOUNTER — NON-APPOINTMENT (OUTPATIENT)
Age: 30
End: 2025-03-21

## 2025-03-28 ENCOUNTER — APPOINTMENT (OUTPATIENT)
Age: 30
End: 2025-03-28
Payer: COMMERCIAL

## 2025-03-28 VITALS
HEART RATE: 83 BPM | DIASTOLIC BLOOD PRESSURE: 82 MMHG | SYSTOLIC BLOOD PRESSURE: 121 MMHG | HEIGHT: 64 IN | RESPIRATION RATE: 14 BRPM | TEMPERATURE: 97.2 F | WEIGHT: 140 LBS | OXYGEN SATURATION: 98 % | BODY MASS INDEX: 23.9 KG/M2

## 2025-03-28 DIAGNOSIS — D24.1 BENIGN NEOPLASM OF RIGHT BREAST: ICD-10-CM

## 2025-03-28 DIAGNOSIS — D05.02 LOBULAR CARCINOMA IN SITU OF LEFT BREAST: ICD-10-CM

## 2025-03-28 DIAGNOSIS — N60.92 UNSPECIFIED BENIGN MAMMARY DYSPLASIA OF LEFT BREAST: ICD-10-CM

## 2025-03-28 DIAGNOSIS — N60.91 UNSPECIFIED BENIGN MAMMARY DYSPLASIA OF RIGHT BREAST: ICD-10-CM

## 2025-03-28 DIAGNOSIS — D24.2 BENIGN NEOPLASM OF LEFT BREAST: ICD-10-CM

## 2025-03-28 PROCEDURE — 99024 POSTOP FOLLOW-UP VISIT: CPT

## 2025-04-21 ENCOUNTER — NON-APPOINTMENT (OUTPATIENT)
Age: 30
End: 2025-04-21

## 2025-04-22 ENCOUNTER — APPOINTMENT (OUTPATIENT)
Dept: PLASTIC SURGERY | Facility: CLINIC | Age: 30
End: 2025-04-22
Payer: COMMERCIAL

## 2025-04-22 DIAGNOSIS — D24.2 BENIGN NEOPLASM OF LEFT BREAST: ICD-10-CM

## 2025-04-22 DIAGNOSIS — D24.1 BENIGN NEOPLASM OF RIGHT BREAST: ICD-10-CM

## 2025-04-22 DIAGNOSIS — N60.91 UNSPECIFIED BENIGN MAMMARY DYSPLASIA OF RIGHT BREAST: ICD-10-CM

## 2025-04-22 DIAGNOSIS — N60.92 UNSPECIFIED BENIGN MAMMARY DYSPLASIA OF LEFT BREAST: ICD-10-CM

## 2025-04-22 DIAGNOSIS — D05.02 LOBULAR CARCINOMA IN SITU OF LEFT BREAST: ICD-10-CM

## 2025-04-22 PROCEDURE — 99024 POSTOP FOLLOW-UP VISIT: CPT

## 2025-06-16 ENCOUNTER — NON-APPOINTMENT (OUTPATIENT)
Age: 30
End: 2025-06-16

## 2025-06-20 ENCOUNTER — MED ADMIN CHARGE (OUTPATIENT)
Age: 30
End: 2025-06-20

## 2025-06-20 ENCOUNTER — APPOINTMENT (OUTPATIENT)
Dept: INTERNAL MEDICINE | Facility: CLINIC | Age: 30
End: 2025-06-20
Payer: COMMERCIAL

## 2025-06-20 ENCOUNTER — OUTPATIENT (OUTPATIENT)
Dept: OUTPATIENT SERVICES | Facility: HOSPITAL | Age: 30
LOS: 1 days | Discharge: HOME | End: 2025-06-20

## 2025-06-20 DIAGNOSIS — N60.91 UNSPECIFIED BENIGN MAMMARY DYSPLASIA OF RIGHT BREAST: ICD-10-CM

## 2025-06-20 DIAGNOSIS — K08.409 PARTIAL LOSS OF TEETH, UNSPECIFIED CAUSE, UNSPECIFIED CLASS: Chronic | ICD-10-CM

## 2025-06-20 DIAGNOSIS — N60.92 UNSPECIFIED BENIGN MAMMARY DYSPLASIA OF LEFT BREAST: ICD-10-CM

## 2025-06-20 PROCEDURE — 90746 HEPB VACCINE 3 DOSE ADULT IM: CPT

## 2025-06-20 PROCEDURE — G0010: CPT

## 2025-06-23 ENCOUNTER — APPOINTMENT (OUTPATIENT)
Dept: HEMATOLOGY ONCOLOGY | Facility: CLINIC | Age: 30
End: 2025-06-23

## 2025-06-23 ENCOUNTER — APPOINTMENT (OUTPATIENT)
Dept: HEMATOLOGY ONCOLOGY | Facility: CLINIC | Age: 30
End: 2025-06-23
Payer: COMMERCIAL

## 2025-06-23 VITALS
OXYGEN SATURATION: 95 % | HEART RATE: 112 BPM | DIASTOLIC BLOOD PRESSURE: 72 MMHG | WEIGHT: 144.62 LBS | RESPIRATION RATE: 16 BRPM | SYSTOLIC BLOOD PRESSURE: 112 MMHG | BODY MASS INDEX: 24.69 KG/M2 | HEIGHT: 64.33 IN | TEMPERATURE: 98.6 F

## 2025-06-23 PROCEDURE — 99205 OFFICE O/P NEW HI 60 MIN: CPT

## 2025-07-01 ENCOUNTER — RESULT REVIEW (OUTPATIENT)
Age: 30
End: 2025-07-01

## 2025-07-01 ENCOUNTER — APPOINTMENT (OUTPATIENT)
Dept: ULTRASOUND IMAGING | Facility: CLINIC | Age: 30
End: 2025-07-01

## 2025-07-01 ENCOUNTER — APPOINTMENT (OUTPATIENT)
Dept: MRI IMAGING | Facility: CLINIC | Age: 30
End: 2025-07-01
Payer: COMMERCIAL

## 2025-07-01 PROCEDURE — A9585: CPT | Mod: JW

## 2025-07-01 PROCEDURE — 77049 MRI BREAST C-+ W/CAD BI: CPT

## 2025-07-01 PROCEDURE — 76642 ULTRASOUND BREAST LIMITED: CPT | Mod: 50

## 2025-07-15 ENCOUNTER — NON-APPOINTMENT (OUTPATIENT)
Age: 30
End: 2025-07-15

## (undated) DEVICE — CANISTER DISPOSABLE THIN WALL 3000CC

## (undated) DEVICE — NDL COUNTER FOAM AND MAGNET 40-70

## (undated) DEVICE — GLV 7.5 PROTEXIS (CREAM) MICRO

## (undated) DEVICE — SPONGE PEANUT REGULAR 3/8"

## (undated) DEVICE — GOWN IMPERV XL

## (undated) DEVICE — DRAIN RESERVOIR FOR JACKSON PRATT 100CC CARDINAL

## (undated) DEVICE — PREP BETADINE KIT

## (undated) DEVICE — SOL IRR POUR H2O 250ML

## (undated) DEVICE — SUT POLYSORB 4-0 18" P-12 UNDYED

## (undated) DEVICE — LABELS BLANK W PEN

## (undated) DEVICE — POSITIONER PATIENT SAFETY STRAP 3X60"

## (undated) DEVICE — BAG DECANTER DISP

## (undated) DEVICE — WARMING BLANKET LOWER ADULT

## (undated) DEVICE — SUT VICRYL 3-0 18" PS-2 UNDYED

## (undated) DEVICE — PACK MINOR

## (undated) DEVICE — DRAPE IOBAN 23" X 23"

## (undated) DEVICE — BASIN SET DOUBLE

## (undated) DEVICE — DRAPE INSTRUMENT POUCH 6.75" X 11"

## (undated) DEVICE — SUT POLYSORB 3-0 30" V-20 UNDYED

## (undated) DEVICE — SYR LUER LOK 50CC

## (undated) DEVICE — ELCTR BOVIE TIP BLADE INSULATED 2.75" EDGE WITH SAFETY

## (undated) DEVICE — LAP PAD 18 X 18"

## (undated) DEVICE — SHEATH SURG GUIDE SCOUT DISP STRL

## (undated) DEVICE — SUT SILK 2-0 18" FS

## (undated) DEVICE — VENODYNE/SCD SLEEVE CALF MEDIUM

## (undated) DEVICE — STOPCOCK 3 WAY W SWIVEL MALE LUER LOCK

## (undated) DEVICE — SUT VICRYL 2-0 27" SH UNDYED

## (undated) DEVICE — SPECIMEN CONTAINER 100ML

## (undated) DEVICE — SOL IRR POUR NS 0.9% 500ML

## (undated) DEVICE — SUT SOFSILK 2-0 18" C-23

## (undated) DEVICE — DRSG STERISTRIPS 0.5 X 4"

## (undated) DEVICE — DRSG BIOPATCH DISK W CHG 1" W 4.0MM HOLE

## (undated) DEVICE — ELCTR STRYKER NEPTUNE SMOKE EVACUATION PENCIL (GREEN)

## (undated) DEVICE — CANISTER SUCTION 3000ML

## (undated) DEVICE — DRAPE 3/4 SHEET 52X76"

## (undated) DEVICE — MEDICATION LABELS W MARKER

## (undated) DEVICE — STAPLER SKIN MULTI DIRECTION W35

## (undated) DEVICE — SUT SURGIPRO II 3-0 18" C-14

## (undated) DEVICE — CANISTER SUCTION LID GUARD 3000CC

## (undated) DEVICE — ONETRAC LIGHTED RETRACTOR 135 X 30MM DISP

## (undated) DEVICE — MARKING PEN W RULER

## (undated) DEVICE — DRAPE LAPAROTOMY TRANSVERSE

## (undated) DEVICE — PACK MAJOR ABDOMINAL WITH LAP

## (undated) DEVICE — ELCTR GROUNDING PAD ADULT COVIDIEN

## (undated) DEVICE — TUBING IRR SET FOR CYSTOSCOPY 77"

## (undated) DEVICE — SUT MONOCRYL 3-0 18" PS-2 UNDYED

## (undated) DEVICE — DRAPE TOWEL BLUE 17" X 24"

## (undated) DEVICE — POSITIONER STRAP ARMBOARD VELCRO TS-30

## (undated) DEVICE — DRSG COMBINE 5 X 9"

## (undated) DEVICE — Device